# Patient Record
Sex: FEMALE | Race: ASIAN
[De-identification: names, ages, dates, MRNs, and addresses within clinical notes are randomized per-mention and may not be internally consistent; named-entity substitution may affect disease eponyms.]

---

## 2018-06-05 ENCOUNTER — HOSPITAL ENCOUNTER (OUTPATIENT)
Dept: HOSPITAL 53 - M RAD | Age: 68
End: 2018-06-05
Attending: INTERNAL MEDICINE
Payer: MEDICARE

## 2018-06-05 DIAGNOSIS — Z12.31: Primary | ICD-10-CM

## 2018-06-05 PROCEDURE — 77067 SCR MAMMO BI INCL CAD: CPT

## 2019-02-06 ENCOUNTER — HOSPITAL ENCOUNTER (EMERGENCY)
Dept: HOSPITAL 53 - M ED | Age: 69
Discharge: HOME | End: 2019-02-06
Payer: MEDICARE

## 2019-02-06 VITALS — HEIGHT: 59 IN | BODY MASS INDEX: 22.22 KG/M2 | WEIGHT: 110.23 LBS

## 2019-02-06 VITALS — DIASTOLIC BLOOD PRESSURE: 77 MMHG | SYSTOLIC BLOOD PRESSURE: 164 MMHG

## 2019-02-06 DIAGNOSIS — I10: ICD-10-CM

## 2019-02-06 DIAGNOSIS — E78.5: ICD-10-CM

## 2019-02-06 DIAGNOSIS — R20.2: Primary | ICD-10-CM

## 2019-02-06 LAB
APTT BLD: 26.5 SECONDS (ref 25.4–37.6)
BASOPHILS # BLD AUTO: 0 10^3/UL (ref 0–0.2)
BASOPHILS NFR BLD AUTO: 0.9 % (ref 0–1)
BUN SERPL-MCNC: 16 MG/DL (ref 7–18)
CALCIUM SERPL-MCNC: 8.8 MG/DL (ref 8.8–10.2)
CHLORIDE SERPL-SCNC: 108 MEQ/L (ref 98–107)
CK MB CFR.DF SERPL CALC: 0.93
CK MB SERPL-MCNC: 2 NG/ML (ref ?–3.6)
CK SERPL-CCNC: 161 U/L (ref 26–192)
CO2 SERPL-SCNC: 26 MEQ/L (ref 21–32)
CREAT SERPL-MCNC: 0.94 MG/DL (ref 0.55–1.3)
EOSINOPHIL # BLD AUTO: 0 10^3/UL (ref 0–0.5)
EOSINOPHIL NFR BLD AUTO: 0.5 % (ref 0–3)
GFR SERPL CREATININE-BSD FRML MDRD: > 60 ML/MIN/{1.73_M2} (ref 45–?)
GLUCOSE SERPL-MCNC: 94 MG/DL (ref 70–100)
HCT VFR BLD AUTO: 42 % (ref 36–47)
HGB BLD-MCNC: 14.7 G/DL (ref 12–15.5)
INR PPP: 0.87
LYMPHOCYTES # BLD AUTO: 1.9 10^3/UL (ref 1.5–4.5)
LYMPHOCYTES NFR BLD AUTO: 43.4 % (ref 24–44)
MCH RBC QN AUTO: 32.2 PG (ref 27–33)
MCHC RBC AUTO-ENTMCNC: 35 G/DL (ref 32–36.5)
MCV RBC AUTO: 91.9 FL (ref 80–96)
MONOCYTES # BLD AUTO: 0.4 10^3/UL (ref 0–0.8)
MONOCYTES NFR BLD AUTO: 9.3 % (ref 0–5)
NEUTROPHILS # BLD AUTO: 2 10^3/UL (ref 1.8–7.7)
NEUTROPHILS NFR BLD AUTO: 45.9 % (ref 36–66)
PLATELET # BLD AUTO: 310 10^3/UL (ref 150–450)
POTASSIUM SERPL-SCNC: 3.9 MEQ/L (ref 3.5–5.1)
PROTHROMBIN TIME: 11.9 SECONDS (ref 12.1–14.4)
RBC # BLD AUTO: 4.57 10^6/UL (ref 4–5.4)
SODIUM SERPL-SCNC: 140 MEQ/L (ref 136–145)
TROPONIN I SERPL-MCNC: < 0.02 NG/ML (ref ?–0.1)
WBC # BLD AUTO: 4.4 10^3/UL (ref 4–10)

## 2019-02-06 NOTE — REPVR
EXAM: 

 MR Head Without Contrast 



EXAM DATE/TIME: 

 2/6/2019 3:55 PM 



CLINICAL HISTORY: 

 68 years old, female; Signs and symptoms; Weakness, extremity; Right; 

Additional info: Paresthesia right arm 



TECHNIQUE: 

 MR of the head without contrast. 



COMPARISON: 

 MRA BRAIN W/O CONTRAST 2/6/2019 5:40 PM 



FINDINGS: 

 Brain:  Multiple foci of T2 lengthening are demonstrated in the subcortical, 

periventricular and centrum semiovale white matter consistent with age-related 

small vessel gliosis. 

 Ventricles: Normal. No ventriculomegaly. 

 Bones/joints: Unremarkable. 

 Soft tissues: Normal. 

 Sinuses: Normal as visualized. No acute sinusitis. 

 Mastoid air cells: Normal as visualized. No mastoid effusion. 

 Orbits: Unremarkable. 



IMPRESSION: 

Multiple foci of T2 lengthening are demonstrated in the subcortical, 

periventricular and centrum semiovale white matter consistent with age-related 

small vessel gliosis. 



Electronically signed by: Wilbert Salter On 02/06/2019  18:49:36 PM

## 2019-02-06 NOTE — REP
Chest one-view

 

HISTORY: Infarction

 

Comparison: 06/26/2008

 

The lungs are clear.  The heart is normal in size.  The pulmonary vasculature is

normal in appearance.

 

Impression:  No acute disease.

 

 

Electronically Signed by

Nj Cardona MD 02/06/2019 02:57 P

## 2019-02-06 NOTE — ECGEPIP
Stationary ECG Study

                           [facility] - ED

                                       

                                       Test Date:    2019

Pat Name:     IRVIN KENNEDY              Department:   

Patient ID:   [id number]                 Room:         -

Gender:       F                        Technician:   MELVI

:          1950               Requested By: YESSICA SPANN

Order Number: [id number]     Reading MD:   Micah Mays

                                 Measurements

Intervals                              Axis          

Rate:         72                       P:            37

AL:           134                      QRS:          -22

QRSD:         77                       T:            26

QT:           406                                    

QTc:          445                                    

                           Interpretive Statements

SINUS RHYTHM

LOW QRS VOLTAGE IN EXTREMITY LEADS

INCOMPLETE RIGHT BUNDLE BRANCH BLOCK

POSSIBLE SEPTAL MYOCARDIAL INFARCTION, PROBABLY OLD

NO PRIORS FOR COMPARISON

Electronically Signed On 2019 21:18:28 EST by Micah Mays

## 2019-02-06 NOTE — REPVR
EXAM: 

 MR Angiogram Head Without Contrast, Arteries 



EXAM DATE/TIME: 

 2/6/2019 3:55 PM 



CLINICAL HISTORY: 

 68 years old, female; Signs and symptoms; Weakness; Additional info: 

Paresthesia right arm 



TECHNIQUE: 

 MR angiogram head without contrast. Exam focused on the arteries. 



COMPARISON: 

 CT Head without contrast 2/6/2019 2:25 PM 



FINDINGS: 

 Right internal carotid artery:  Mild luminal irregularity in the right 

intracranial carotid artery consistent with mild stenotic disease. 

 Right anterior cerebral artery: Unremarkable. No occlusion or significant 

stenosis. No aneurysm. 

 Right middle cerebral artery: Unremarkable. No occlusion or significant 

stenosis. No aneurysm. 

 Right posterior cerebral artery: Unremarkable. No occlusion or significant 

stenosis. No aneurysm. 

 Right vertebral artery: Unremarkable. No occlusion or significant stenosis. No 

aneurysm. 



 Left internal carotid artery:  Mild luminal irregularity in the left 

intracranial carotid artery consistent with mild stenotic disease. 

 Left anterior cerebral artery: Unremarkable. No occlusion or significant 

stenosis. No aneurysm. 

 Left middle cerebral artery: Unremarkable. No occlusion or significant 

stenosis. No aneurysm. 

 Left posterior cerebral artery: Unremarkable. No occlusion or significant 

stenosis. No aneurysm. 

 Left vertebral artery: Unremarkable. No occlusion or significant stenosis. No 

aneurysm. 

 Basilar artery: Mild tortuosity. Otherwise unremarkable. No occlusion or 

significant stenosis. No aneurysm. 



IMPRESSION: 

1. Mild luminal irregularity in the right intracranial carotid artery 

consistent with mild stenotic disease. 

2. Mild luminal irregularity in the left intracranial carotid artery consistent 

with mild stenotic disease. 



Electronically signed by: Wilbert Salter On 02/06/2019  18:48:21 PM

## 2019-02-06 NOTE — REP
CT Head without contrast

 

HISTORY:  Infarction

 

COMPARISON: None

 

There is no intraparenchymal hemorrhage, acute infarct,  mass or midline shift.

The ventricular system and cortical sulci are dilated consistent with minimal

volume loss. There is no extra cerebral collection.  There is no fracture.  The

visualized sinuses are clear.

 

IMPRESSION: Minimal volume loss.

 

 

 

 

Electronically Signed by

Nj Cardona MD 02/06/2019 02:42 P

## 2019-02-07 NOTE — ED PDOC
Post-Departure Follow-Up


octaviano meyers and zahira faxed formalreport of mri brain/mra brain for fu mlg Lundborg-Gray,Maja MD           Feb 7, 2019 12:32

## 2021-10-08 ENCOUNTER — HOSPITAL ENCOUNTER (OUTPATIENT)
Dept: HOSPITAL 53 - M LABSMTC | Age: 71
End: 2021-10-08
Attending: ANESTHESIOLOGY
Payer: MEDICARE

## 2021-10-08 DIAGNOSIS — Z01.812: Primary | ICD-10-CM

## 2021-10-08 DIAGNOSIS — Z20.822: ICD-10-CM

## 2021-10-13 ENCOUNTER — HOSPITAL ENCOUNTER (OUTPATIENT)
Dept: HOSPITAL 53 - M OPP | Age: 71
Discharge: HOME | End: 2021-10-13
Attending: INTERNAL MEDICINE
Payer: MEDICARE

## 2021-10-13 VITALS — BODY MASS INDEX: 23.87 KG/M2 | WEIGHT: 118.4 LBS | HEIGHT: 59 IN

## 2021-10-13 VITALS — SYSTOLIC BLOOD PRESSURE: 178 MMHG | DIASTOLIC BLOOD PRESSURE: 76 MMHG

## 2021-10-13 DIAGNOSIS — Z12.11: Primary | ICD-10-CM

## 2021-10-13 DIAGNOSIS — K64.0: ICD-10-CM

## 2021-10-13 DIAGNOSIS — Z79.899: ICD-10-CM

## 2021-10-13 DIAGNOSIS — Z80.0: ICD-10-CM

## 2021-10-13 NOTE — ROOR
________________________________________________________________________________

Patient Name: Lashell Nolen              Procedure Date: 10/13/2021 11:51 AM

MRN: [id number]                          Account Number: [id number]

YOB: 1950               Age: 71

Room: [facility]                            Gender: Female

Note Status: Finalized                 

________________________________________________________________________________

 

Procedure:            Total Colonoscopy to Cecum + ileoscopy

Indications:          Colon cancer screening in patient at increased risk: 

                      Colorectal cancer in brother, Last colonoscopy: 2016

Providers:            Eder Rojas MD

Referring MD:         Socrates Addison MD

Requesting Provider:  

Medicines:            Monitored Anesthesia Care

Complications:        No immediate complications.

________________________________________________________________________________

Procedure:            Pre-Anesthesia Assessment:

                      - The heart rate, respiratory rate, oxygen saturations, 

                      blood pressure, adequacy of pulmonary ventilation, and 

                      response to care were monitored throughout the procedure.

                      The Colonoscope was introduced through the anus and 

                      advanced to the terminal ileum, with identification of 

                      the appendiceal orifice and IC valve. The colonoscopy 

                      was performed without difficulty. The patient tolerated 

                      the procedure well. The quality of the bowel preparation 

                      was excellent.

                                                                                

Findings:

     The perianal and digital rectal examinations were normal.

     Non-bleeding internal hemorrhoids were found during retroflexion. The 

     hemorrhoids were small and Grade I (internal hemorrhoids that do not 

     prolapse).

     No other significant abnormalities were identified in a careful 

     examination of the remainder of the colon.

     The exam was otherwise without abnormality on direct and retroflexion 

     views.

     The terminal ileum appeared normal.

     The exam was otherwise without abnormality.

                                                                                

Impression:           - Non-bleeding internal hemorrhoids.

                      - The examination was otherwise normal on direct and 

                      retroflexion views.

                      - The examined portion of the ileum was normal.

                      - The examination was otherwise normal.

                      - No specimens collected.

                      - The exam was otherwise normal to the cecum.

Recommendation:       - Patient has a contact number available for 

                      emergencies. The signs and symptoms of potential delayed 

                      complications were discussed with the patient. Return to 

                      normal activities tomorrow. Written discharge 

                      instructions were provided to the patient.

                      - High fiber diet.

                      - Discharge patient to home.

                      - Continue present medications.

                      - Repeat colonoscopy in 5 years for screening purposes.

                      - Return to referring physician.

                      - The findings and recommendations were discussed with 

                      the patient.

                                                                                

Procedure Code(s):    --- Professional ---

                      , Colorectal cancer screening; colonoscopy on 

                      individual at high risk

Diagnosis Code(s):    --- Professional ---

                      Z80.0, Family history of malignant neoplasm of digestive 

                      organs

                      K64.0, First degree hemorrhoids

 

CPT copyright 2019 [employer]. All rights reserved.

 

The codes documented in this report are preliminary and upon  review may 

be revised to meet current compliance requirements.

 

Eder Rojas MD

____________________

Eder Rojas MD

10/13/2021 12:08:31 PM

Electronically signed by Eder Rojas MD

Number of Addenda: 0

 

Note Initiated On: 10/13/2021 11:51 AM

Estimated Blood Loss: Estimated blood loss: none.

## 2022-11-21 ENCOUNTER — HOSPITAL ENCOUNTER (OUTPATIENT)
Dept: HOSPITAL 53 - M RAD | Age: 72
End: 2022-11-21
Attending: PHYSICIAN ASSISTANT
Payer: MEDICARE

## 2022-11-21 DIAGNOSIS — N26.1: Primary | ICD-10-CM

## 2022-11-21 DIAGNOSIS — R31.29: ICD-10-CM
